# Patient Record
Sex: MALE
[De-identification: names, ages, dates, MRNs, and addresses within clinical notes are randomized per-mention and may not be internally consistent; named-entity substitution may affect disease eponyms.]

---

## 2024-07-08 ENCOUNTER — CARE COORDINATION (OUTPATIENT)
Dept: OTHER | Facility: CLINIC | Age: 34
End: 2024-07-08

## 2024-08-01 ENCOUNTER — CARE COORDINATION (OUTPATIENT)
Dept: OTHER | Facility: CLINIC | Age: 34
End: 2024-08-01

## 2024-08-01 NOTE — CARE COORDINATION
Ambulatory Care Coordination Note     8/1/2024 12:12 PM     Patient outreach attempt by this ACM today to offer care management services. ACM was unable to reach the patient by telephone today;  call answered and then disconnected. Unable to leave a message are speak to the patient.      ACM: SADIA JEAN, RN     Care Summary Note: Attempted to outreach the patient to determine discharge from residential treatment in [place].     PCP/Specialist follow up: NA      Follow Up:   Plan for next ACM outreach in approximately 1 week to complete:  - outreach attempt to offer care management services.

## 2024-08-08 ENCOUNTER — CARE COORDINATION (OUTPATIENT)
Dept: OTHER | Facility: CLINIC | Age: 34
End: 2024-08-08

## 2024-08-08 NOTE — CARE COORDINATION
Ambulatory Care Coordination Note     8/8/2024 2:39 PM     Patient outreach attempt by this ACM today to offer care management services. ACM was unable to reach the patient by telephone today;  no answer, no VM     ACM: SADIA JEAN RN     Care Summary Note: Telephonic outreach attempt to offer care management services. This is the 2nd call attempt.     PCP/Specialist follow up:       Follow Up:   Plan for next ACM outreach in approximately 2 weeks to complete:  - outreach attempt to offer care management services.

## 2024-08-22 ENCOUNTER — CARE COORDINATION (OUTPATIENT)
Dept: OTHER | Facility: CLINIC | Age: 34
End: 2024-08-22

## 2024-08-22 NOTE — CARE COORDINATION
Ambulatory Care Coordination Note     8/22/2024 2:56 PM     Patient outreach attempt by this ACM today to offer care management services. ACM was unable to reach the patient by telephone today; left voice message requesting a return phone call to this ACM.     ACM: SADIA JEAN RN     Care Summary Note: Outreach attempt to follow up with the patient. Will attempt an additional outreach    PCP/Specialist follow up:       Follow Up:   Plan for next ACM outreach in approximately 3 weeks to complete:  - outreach attempt to offer care management services.

## 2024-09-12 ENCOUNTER — CARE COORDINATION (OUTPATIENT)
Dept: OTHER | Facility: CLINIC | Age: 34
End: 2024-09-12